# Patient Record
Sex: FEMALE | Race: WHITE | Employment: UNEMPLOYED | URBAN - METROPOLITAN AREA
[De-identification: names, ages, dates, MRNs, and addresses within clinical notes are randomized per-mention and may not be internally consistent; named-entity substitution may affect disease eponyms.]

---

## 2019-09-21 ENCOUNTER — HOSPITAL ENCOUNTER (EMERGENCY)
Age: 39
Discharge: HOME | End: 2019-09-21

## 2019-09-21 ENCOUNTER — APPOINTMENT (OUTPATIENT)
Dept: GENERAL RADIOLOGY | Age: 39
End: 2019-09-21

## 2019-09-21 VITALS
RESPIRATION RATE: 16 BRPM | HEART RATE: 91 BPM | WEIGHT: 161 LBS | SYSTOLIC BLOOD PRESSURE: 130 MMHG | DIASTOLIC BLOOD PRESSURE: 90 MMHG | OXYGEN SATURATION: 98 % | HEIGHT: 65 IN | BODY MASS INDEX: 26.82 KG/M2 | TEMPERATURE: 98 F

## 2019-09-21 DIAGNOSIS — R03.0 ELEVATED BLOOD PRESSURE READING: ICD-10-CM

## 2019-09-21 DIAGNOSIS — M79.671 ACUTE PAIN OF RIGHT FOOT: Primary | ICD-10-CM

## 2019-09-21 PROCEDURE — 73630 X-RAY EXAM OF FOOT: CPT

## 2019-09-21 PROCEDURE — 73610 X-RAY EXAM OF ANKLE: CPT

## 2019-09-21 PROCEDURE — 99283 EMERGENCY DEPT VISIT LOW MDM: CPT

## 2019-09-21 RX ORDER — PREDNISONE 20 MG/1
40 TABLET ORAL DAILY
Qty: 10 TABLET | Refills: 0 | Status: SHIPPED | OUTPATIENT
Start: 2019-09-21 | End: 2019-09-26

## 2019-09-21 ASSESSMENT — PAIN DESCRIPTION - LOCATION: LOCATION: ANKLE

## 2019-09-21 ASSESSMENT — PAIN SCALES - GENERAL: PAINLEVEL_OUTOF10: 6

## 2019-09-21 ASSESSMENT — PAIN DESCRIPTION - PROGRESSION: CLINICAL_PROGRESSION: GRADUALLY WORSENING

## 2019-09-21 ASSESSMENT — PAIN DESCRIPTION - PAIN TYPE: TYPE: ACUTE PAIN

## 2019-09-21 ASSESSMENT — PAIN DESCRIPTION - ORIENTATION: ORIENTATION: RIGHT

## 2019-09-21 ASSESSMENT — PAIN DESCRIPTION - DESCRIPTORS: DESCRIPTORS: CONSTANT

## 2020-01-31 ENCOUNTER — APPOINTMENT (OUTPATIENT)
Dept: CT IMAGING | Age: 40
End: 2020-01-31
Payer: MEDICAID

## 2020-01-31 ENCOUNTER — HOSPITAL ENCOUNTER (EMERGENCY)
Age: 40
Discharge: HOME | End: 2020-02-01
Payer: MEDICAID

## 2020-01-31 LAB
ALBUMIN SERPL-MCNC: 4.4 G/DL (ref 3.5–5.2)
ALP BLD-CCNC: 38 U/L (ref 35–104)
ALT SERPL-CCNC: 16 U/L (ref 0–32)
ANION GAP SERPL CALCULATED.3IONS-SCNC: 11 MMOL/L (ref 7–16)
AST SERPL-CCNC: 17 U/L (ref 0–31)
BASOPHILS ABSOLUTE: 0.04 E9/L (ref 0–0.2)
BASOPHILS RELATIVE PERCENT: 0.5 % (ref 0–2)
BILIRUB SERPL-MCNC: <0.2 MG/DL (ref 0–1.2)
BILIRUBIN URINE: NEGATIVE
BLOOD, URINE: NEGATIVE
BUN BLDV-MCNC: 11 MG/DL (ref 6–20)
CALCIUM SERPL-MCNC: 9.3 MG/DL (ref 8.6–10.2)
CHLORIDE BLD-SCNC: 103 MMOL/L (ref 98–107)
CLARITY: CLEAR
CO2: 25 MMOL/L (ref 22–29)
COLOR: YELLOW
CREAT SERPL-MCNC: 0.6 MG/DL (ref 0.5–1)
EOSINOPHILS ABSOLUTE: 0.08 E9/L (ref 0.05–0.5)
EOSINOPHILS RELATIVE PERCENT: 1 % (ref 0–6)
GFR AFRICAN AMERICAN: >60
GFR NON-AFRICAN AMERICAN: >60 ML/MIN/1.73
GLUCOSE BLD-MCNC: 119 MG/DL (ref 74–99)
GLUCOSE URINE: NEGATIVE MG/DL
HCG(URINE) PREGNANCY TEST: NEGATIVE
HCT VFR BLD CALC: 39.1 % (ref 34–48)
HEMOGLOBIN: 12.9 G/DL (ref 11.5–15.5)
IMMATURE GRANULOCYTES #: 0.02 E9/L
IMMATURE GRANULOCYTES %: 0.2 % (ref 0–5)
KETONES, URINE: ABNORMAL MG/DL
LEUKOCYTE ESTERASE, URINE: NEGATIVE
LYMPHOCYTES ABSOLUTE: 3.71 E9/L (ref 1.5–4)
LYMPHOCYTES RELATIVE PERCENT: 45.1 % (ref 20–42)
MCH RBC QN AUTO: 31.7 PG (ref 26–35)
MCHC RBC AUTO-ENTMCNC: 33 % (ref 32–34.5)
MCV RBC AUTO: 96.1 FL (ref 80–99.9)
MONOCYTES ABSOLUTE: 0.73 E9/L (ref 0.1–0.95)
MONOCYTES RELATIVE PERCENT: 8.9 % (ref 2–12)
NEUTROPHILS ABSOLUTE: 3.65 E9/L (ref 1.8–7.3)
NEUTROPHILS RELATIVE PERCENT: 44.3 % (ref 43–80)
NITRITE, URINE: NEGATIVE
PDW BLD-RTO: 12.6 FL (ref 11.5–15)
PH UA: 6.5 (ref 5–9)
PLATELET # BLD: 244 E9/L (ref 130–450)
PMV BLD AUTO: 10.3 FL (ref 7–12)
POTASSIUM SERPL-SCNC: 3.7 MMOL/L (ref 3.5–5)
PROTEIN UA: NEGATIVE MG/DL
RBC # BLD: 4.07 E12/L (ref 3.5–5.5)
SODIUM BLD-SCNC: 139 MMOL/L (ref 132–146)
SPECIFIC GRAVITY UA: 1.02 (ref 1–1.03)
TOTAL PROTEIN: 7.1 G/DL (ref 6.4–8.3)
UROBILINOGEN, URINE: 0.2 E.U./DL
WBC # BLD: 8.2 E9/L (ref 4.5–11.5)

## 2020-01-31 PROCEDURE — 70450 CT HEAD/BRAIN W/O DYE: CPT

## 2020-01-31 PROCEDURE — 81003 URINALYSIS AUTO W/O SCOPE: CPT

## 2020-01-31 PROCEDURE — 80053 COMPREHEN METABOLIC PANEL: CPT

## 2020-01-31 PROCEDURE — 99284 EMERGENCY DEPT VISIT MOD MDM: CPT

## 2020-01-31 PROCEDURE — 81025 URINE PREGNANCY TEST: CPT

## 2020-01-31 PROCEDURE — 36415 COLL VENOUS BLD VENIPUNCTURE: CPT

## 2020-01-31 PROCEDURE — 85025 COMPLETE CBC W/AUTO DIFF WBC: CPT

## 2020-01-31 RX ORDER — DIPHENHYDRAMINE HYDROCHLORIDE 50 MG/ML
50 INJECTION INTRAMUSCULAR; INTRAVENOUS ONCE
Status: DISCONTINUED | OUTPATIENT
Start: 2020-01-31 | End: 2020-01-31

## 2020-01-31 RX ORDER — KETOROLAC TROMETHAMINE 30 MG/ML
30 INJECTION, SOLUTION INTRAMUSCULAR; INTRAVENOUS ONCE
Status: DISCONTINUED | OUTPATIENT
Start: 2020-01-31 | End: 2020-01-31

## 2020-01-31 RX ORDER — 0.9 % SODIUM CHLORIDE 0.9 %
1000 INTRAVENOUS SOLUTION INTRAVENOUS ONCE
Status: DISCONTINUED | OUTPATIENT
Start: 2020-01-31 | End: 2020-01-31

## 2020-01-31 RX ORDER — METOCLOPRAMIDE HYDROCHLORIDE 5 MG/ML
10 INJECTION INTRAMUSCULAR; INTRAVENOUS ONCE
Status: DISCONTINUED | OUTPATIENT
Start: 2020-01-31 | End: 2020-01-31

## 2020-01-31 ASSESSMENT — PAIN DESCRIPTION - LOCATION: LOCATION: HEAD

## 2020-01-31 ASSESSMENT — PAIN SCALES - GENERAL: PAINLEVEL_OUTOF10: 1

## 2020-01-31 ASSESSMENT — PAIN DESCRIPTION - DESCRIPTORS: DESCRIPTORS: DULL

## 2020-01-31 ASSESSMENT — PAIN DESCRIPTION - FREQUENCY: FREQUENCY: CONTINUOUS

## 2020-01-31 ASSESSMENT — PAIN DESCRIPTION - PAIN TYPE: TYPE: ACUTE PAIN

## 2020-02-01 VITALS
BODY MASS INDEX: 24.66 KG/M2 | OXYGEN SATURATION: 99 % | TEMPERATURE: 98.1 F | HEIGHT: 65 IN | HEART RATE: 76 BPM | DIASTOLIC BLOOD PRESSURE: 72 MMHG | SYSTOLIC BLOOD PRESSURE: 104 MMHG | RESPIRATION RATE: 16 BRPM | WEIGHT: 148 LBS
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
## 2020-02-01 NOTE — ED NOTES
Discharge instructions given. Patient states verbal understanding. Ambulatory to exit.        Martina Sanchez RN  02/01/20 4894